# Patient Record
Sex: FEMALE | Race: WHITE | NOT HISPANIC OR LATINO | URBAN - METROPOLITAN AREA
[De-identification: names, ages, dates, MRNs, and addresses within clinical notes are randomized per-mention and may not be internally consistent; named-entity substitution may affect disease eponyms.]

---

## 2023-05-30 ENCOUNTER — OFFICE (OUTPATIENT)
Dept: URBAN - METROPOLITAN AREA CLINIC 72 | Facility: CLINIC | Age: 48
End: 2023-05-30

## 2023-05-30 VITALS
HEIGHT: 67 IN | OXYGEN SATURATION: 96 % | SYSTOLIC BLOOD PRESSURE: 122 MMHG | WEIGHT: 293 LBS | DIASTOLIC BLOOD PRESSURE: 88 MMHG | HEART RATE: 84 BPM

## 2023-05-30 DIAGNOSIS — D50.9 IRON DEFICIENCY ANEMIA, UNSPECIFIED: ICD-10-CM

## 2023-05-30 PROCEDURE — 99204 OFFICE O/P NEW MOD 45 MIN: CPT | Performed by: INTERNAL MEDICINE

## 2023-05-30 RX ORDER — SODIUM SULFATE, POTASSIUM SULFATE, MAGNESIUM SULFATE 17.5; 3.13; 1.6 G/ML; G/ML; G/ML
SOLUTION, CONCENTRATE ORAL
Qty: 1 | Refills: 0 | Status: COMPLETED
Start: 2023-05-30 | End: 2023-06-20

## 2023-05-30 NOTE — SERVICEHPINOTES
Dania Aguilar   is seen for an initial visit today.  
br
br   48 year old referred for severe LAVERN. 
br She was told that first time 25 years ago during pregnancy.  After that nothing was every daid till this past year. 
br She has been on iron pills for a while.  She thought hse was doing OK, she has been feeling a little more tired.  She has some shortness of breath but thought it was due to being out of shape. 
br
br No blood in the stool, no black stool. 
br She is on iron 2 pills once a day.  She has not had a blood transfusion or iron infusions.  
br
br No abdominal pain.  
br She has never had a colonoscopy
br She takes meloxicam as needed.  She takes motrin daily at least twice a day because of sciatic nerve issues. 
br
br She has GERD and takes omeprazole daily and that controls thingsbr My nurse has reviewed and updated the medication list with the patient. I have reviewed the medication list along with the documented medical, social and family history. Pertinent details are also noted above in the HPI.

## 2023-05-30 NOTE — SERVICENOTES
Our goal is to partner with you to improve your health and well being. It is important for you to complete necessary testing and follow the instructions given to you at your clinic visit. Our office will call you within 2 weeks with results of any testing but you may also call sooner to obtain results - [phone].   If you have any questions or concerns please feel free to call us.  We take your care very seriously and we thank you for your trust!
- schedule EGD and colonoscopy in [place] 
- don't take iron for 5-7 days prior to the procedure as it will make the prep more difficult
- labs today
- take your iron 1 pill twice a day and take it with vitamin C or orange mika to improve absorbtion
- follow up with NP after procedure (at least a week later so we have the kranthilts)

## 2023-06-27 ENCOUNTER — ON CAMPUS - OUTPATIENT (OUTPATIENT)
Dept: URBAN - METROPOLITAN AREA MEDICAL CENTER 2 | Facility: MEDICAL CENTER | Age: 48
End: 2023-06-27

## 2023-06-27 DIAGNOSIS — K64.8 OTHER HEMORRHOIDS: ICD-10-CM

## 2023-06-27 DIAGNOSIS — K22.10 ULCER OF ESOPHAGUS WITHOUT BLEEDING: ICD-10-CM

## 2023-06-27 DIAGNOSIS — D50.9 IRON DEFICIENCY ANEMIA, UNSPECIFIED: ICD-10-CM

## 2023-06-27 DIAGNOSIS — D17.79 BENIGN LIPOMATOUS NEOPLASM OF OTHER SITES: ICD-10-CM

## 2023-06-27 DIAGNOSIS — K31.7 POLYP OF STOMACH AND DUODENUM: ICD-10-CM

## 2023-06-27 DIAGNOSIS — K44.9 DIAPHRAGMATIC HERNIA WITHOUT OBSTRUCTION OR GANGRENE: ICD-10-CM

## 2023-06-27 PROCEDURE — 43239 EGD BIOPSY SINGLE/MULTIPLE: CPT | Mod: 51 | Performed by: SPECIALIST

## 2023-06-27 PROCEDURE — 45378 DIAGNOSTIC COLONOSCOPY: CPT | Performed by: SPECIALIST

## 2023-07-13 ENCOUNTER — OFFICE (OUTPATIENT)
Dept: URBAN - METROPOLITAN AREA CLINIC 72 | Facility: CLINIC | Age: 48
End: 2023-07-13

## 2023-07-13 VITALS
WEIGHT: 293 LBS | RESPIRATION RATE: 16 BRPM | SYSTOLIC BLOOD PRESSURE: 132 MMHG | DIASTOLIC BLOOD PRESSURE: 78 MMHG | HEIGHT: 67 IN | HEART RATE: 88 BPM

## 2023-07-13 DIAGNOSIS — K22.10 ULCER OF ESOPHAGUS WITHOUT BLEEDING: ICD-10-CM

## 2023-07-13 DIAGNOSIS — D50.9 IRON DEFICIENCY ANEMIA, UNSPECIFIED: ICD-10-CM

## 2023-07-13 DIAGNOSIS — K21.9 GASTRO-ESOPHAGEAL REFLUX DISEASE WITHOUT ESOPHAGITIS: ICD-10-CM

## 2023-07-13 DIAGNOSIS — K22.70 BARRETT'S ESOPHAGUS WITHOUT DYSPLASIA: ICD-10-CM

## 2023-07-13 PROCEDURE — 99214 OFFICE O/P EST MOD 30 MIN: CPT | Performed by: NURSE PRACTITIONER

## 2023-08-22 ENCOUNTER — ON CAMPUS - OUTPATIENT (OUTPATIENT)
Dept: URBAN - METROPOLITAN AREA HOSPITAL 79 | Facility: HOSPITAL | Age: 48
End: 2023-08-22

## 2023-08-22 DIAGNOSIS — K22.70 BARRETT'S ESOPHAGUS WITHOUT DYSPLASIA: ICD-10-CM

## 2023-08-22 PROCEDURE — 43239 EGD BIOPSY SINGLE/MULTIPLE: CPT | Performed by: INTERNAL MEDICINE

## 2025-04-22 ENCOUNTER — TELEHEALTH PROVIDED OTHER THAN IN PATIENT'S HOME (OUTPATIENT)
Dept: URBAN - METROPOLITAN AREA CLINIC 72 | Facility: CLINIC | Age: 50
End: 2025-04-22
Payer: COMMERCIAL

## 2025-04-22 VITALS — WEIGHT: 293 LBS | HEIGHT: 67 IN

## 2025-04-22 DIAGNOSIS — K22.70 BARRETT'S ESOPHAGUS WITHOUT DYSPLASIA: ICD-10-CM

## 2025-04-22 DIAGNOSIS — Z79.899 OTHER LONG TERM (CURRENT) DRUG THERAPY: ICD-10-CM

## 2025-04-22 DIAGNOSIS — D50.9 IRON DEFICIENCY ANEMIA, UNSPECIFIED: ICD-10-CM

## 2025-04-22 DIAGNOSIS — E53.8 DEFICIENCY OF OTHER SPECIFIED B GROUP VITAMINS: ICD-10-CM

## 2025-04-22 DIAGNOSIS — K21.9 GASTRO-ESOPHAGEAL REFLUX DISEASE WITHOUT ESOPHAGITIS: ICD-10-CM

## 2025-04-22 PROCEDURE — 99212 OFFICE O/P EST SF 10 MIN: CPT | Mod: 95 | Performed by: INTERNAL MEDICINE

## 2025-04-22 RX ORDER — PANTOPRAZOLE 40 MG/1
40 TABLET, DELAYED RELEASE ORAL
Qty: 90 | Refills: 3 | Status: ACTIVE

## 2025-04-22 NOTE — SERVICEHPINOTES
is seen today for a follow-up visit. 
br
Edwige was initially seen 5/30/2023. Dr. Gómezbr48 year old referred for severe LAVERN.Edwige was told that first time 25 years ago during pregnancy. After that nothing was every daid till this past year.Edwige has been on iron pills for a while. She thought hse was doing OK, she has been feeling a little more tired. She has some shortness of breath but thought it was due to being out of shape.brNo blood in the stool, no black stool.Edwige is on iron 2 pills once a day. She has not had a blood transfusion or iron infusions.brNo abdominal pain.Edwige has never had a colonoscopybrShe takes meloxicam as needed. She takes motrin daily at least twice a day because of sciatic nerve issues.Edwige has GERD and takes omeprazole daily and that controls thingsinterval history, 7/13/2023brEGD revealed an esophageal ulcer and Das's esophagus, negative dysplasia, fundic gland polyps and a small hiatal hernia. Colonoscopy revealed small hemorrhoids a lipoma and diverticulosis. She was placed on Protonix 40 mg twice a day. She needs a follow-up EGD in 8 weeks She was referred to hematology, Dr. Rodriguez. She has completed 1 iron infusion. her fatigue is better. She currently denies any GI symptoms. 
br
br Plan from last visit:
Bienvenido(33303) citlaly
br Interval History:
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 
br She is seeing [facility] for b12 and iron deficiency and last hgb was normal. 
br She is out of medicine and when she stopped she has been ahvin ga lot of trouble wtih acid reflux.  There is chest tightening and difficulty swaloowing.  
br She is on pantoprazole 40 mg once a day. She was seen by hematology in Feb and get a b12  and iron injections.  She has been takin OTC b12 injections.  She is due to go back to them in Maybr
br My nurse has reviewed and updated the medication list with the patient (medication reconciliation). I have also reviewed the medication list. New updates were made to the patient's medical, social and family history. Pertinent details are also noted above in the HPI.

## 2025-04-22 NOTE — SERVICENOTES
Telehealth Platform Used: doximity
Location of patient: work
Location of provider: [place] [facility]
Other persons participating:none